# Patient Record
Sex: MALE | Race: WHITE | NOT HISPANIC OR LATINO | Employment: FULL TIME | URBAN - NONMETROPOLITAN AREA
[De-identification: names, ages, dates, MRNs, and addresses within clinical notes are randomized per-mention and may not be internally consistent; named-entity substitution may affect disease eponyms.]

---

## 2023-09-20 ENCOUNTER — OFFICE VISIT (OUTPATIENT)
Dept: FAMILY MEDICINE CLINIC | Facility: CLINIC | Age: 23
End: 2023-09-20

## 2023-09-20 VITALS
RESPIRATION RATE: 16 BRPM | HEIGHT: 73 IN | SYSTOLIC BLOOD PRESSURE: 138 MMHG | HEART RATE: 83 BPM | WEIGHT: 222 LBS | OXYGEN SATURATION: 99 % | DIASTOLIC BLOOD PRESSURE: 78 MMHG | TEMPERATURE: 98 F | BODY MASS INDEX: 29.42 KG/M2

## 2023-09-20 DIAGNOSIS — Z76.89 ENCOUNTER TO ESTABLISH CARE WITH NEW DOCTOR: Primary | ICD-10-CM

## 2023-09-20 DIAGNOSIS — R05.9 COUGH, UNSPECIFIED TYPE: ICD-10-CM

## 2023-09-20 DIAGNOSIS — R43.2 LOSS OF TASTE: ICD-10-CM

## 2023-09-20 RX ORDER — LEVOCETIRIZINE DIHYDROCHLORIDE 5 MG/1
5 TABLET, FILM COATED ORAL EVERY EVENING
Qty: 30 TABLET | Refills: 2 | Status: SHIPPED | OUTPATIENT
Start: 2023-09-20

## 2023-09-20 NOTE — PROGRESS NOTES
"Chief Complaint  Establish Care and Cough (Chronic cough even prior to Covid mucosa is clear)    Subjective        Conner Gamboa presents to [facility]  History of Present Illness    Presents to establish [facility] previously from [place] has chronic cough drainage, no known allergies has tried allergy medicine in the past for maybe a few days or week without much change.  Denies heartburn or other symptoms or concerns.    He works denies any allergens at home including pets or other, no significant family history other than alcoholism in father passed from cirrhosis       Objective   Vital Signs:  /78   Pulse 83   Temp 98 °F (36.7 °C)   Resp 16   Ht 185.4 cm (73\")   Wt 101 kg (222 lb)   SpO2 99%   BMI 29.29 kg/m²   Estimated body mass index is 29.29 kg/m² as calculated from the following:    Height as of this encounter: 185.4 cm (73\").    Weight as of this encounter: 101 kg (222 lb).       BMI is >= 25 and <30. (Overweight) The following options were offered after discussion;: exercise counseling/recommendations      Physical Exam  Vitals reviewed.   Constitutional:       Appearance: Normal appearance. He is well-developed.   HENT:      Head: Normocephalic and atraumatic.      Right Ear: External ear normal.      Left Ear: External ear normal.      Nose: Nose normal.   Eyes:      Conjunctiva/sclera: Conjunctivae normal.      Pupils: Pupils are equal, round, and reactive to light.   Cardiovascular:      Rate and Rhythm: Normal rate.   Pulmonary:      Effort: Pulmonary effort is normal.      Breath sounds: Normal breath sounds.   Abdominal:      General: There is no distension.   Skin:     General: Skin is warm and dry.   Neurological:      Mental Status: He is alert and oriented to person, place, and time. Mental status is at baseline.   Psychiatric:         Mood and Affect: Mood and affect normal.         Behavior: Behavior normal.         Thought Content: Thought content " normal.         Judgment: Judgment normal.      Result Review :  The following data was reviewed by: Kemal Daigle DO on 09/20/2023:                 Assessment and Plan   Diagnoses and all orders for this visit:    1. Encounter to establish care with new doctor (Primary)    2. Cough, unspecified type    3. Loss of taste    Other orders  -     levocetirizine (XYZAL) 5 MG tablet; Take 1 tablet by mouth Every Evening.  Dispense: 30 tablet; Refill: 2      Advised using allergy medicine for couple weeks to see if improved discussed alternative diagnosis and considerations, discussed Flonase versus oral tablet we will try Xyzal for now follow-up with no improvement yearly for physical      Reviewed health status and screened for age appropriate conditions, will order labs today to manage, screen and follow up at least yearly    Recommended and reviewed age appropriate immunizations cancer screenings today     Recommend wearing sunscreen and following up on any concerning skin conditions or lesions, wearing seat belts and other risk reduction measures to lessen incident of death, disease or other     Counseled on risks, disease and advice given on screening and continued management of health and condition through the next year until next physical              Follow Up   Return if symptoms worsen or fail to improve, for Recheck.  Patient was given instructions and counseling regarding his condition or for health maintenance advice. Please see specific information pulled into the AVS if appropriate.

## 2024-07-10 PROCEDURE — 87081 CULTURE SCREEN ONLY: CPT | Performed by: NURSE PRACTITIONER
